# Patient Record
Sex: MALE | Race: WHITE | NOT HISPANIC OR LATINO | Employment: FULL TIME | URBAN - METROPOLITAN AREA
[De-identification: names, ages, dates, MRNs, and addresses within clinical notes are randomized per-mention and may not be internally consistent; named-entity substitution may affect disease eponyms.]

---

## 2017-08-19 ENCOUNTER — HOSPITAL ENCOUNTER (EMERGENCY)
Facility: HOSPITAL | Age: 32
Discharge: HOME | End: 2017-08-19
Attending: EMERGENCY MEDICINE | Admitting: EMERGENCY MEDICINE

## 2017-08-19 VITALS
BODY MASS INDEX: 26.41 KG/M2 | OXYGEN SATURATION: 97 % | TEMPERATURE: 99 F | WEIGHT: 195 LBS | RESPIRATION RATE: 16 BRPM | SYSTOLIC BLOOD PRESSURE: 126 MMHG | HEART RATE: 101 BPM | HEIGHT: 72 IN | DIASTOLIC BLOOD PRESSURE: 94 MMHG

## 2017-08-19 DIAGNOSIS — T40.601A OPIATE OVERDOSE, ACCIDENTAL OR UNINTENTIONAL, INITIAL ENCOUNTER: Primary | ICD-10-CM
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
## 2017-08-19 DIAGNOSIS — S01.81XA LACERATION OF FOREHEAD, INITIAL ENCOUNTER: ICD-10-CM

## 2017-08-19 PROCEDURE — 12011 RPR F/E/E/N/L/M 2.5 CM/<: CPT | Mod: ,,, | Performed by: EMERGENCY MEDICINE

## 2017-08-19 PROCEDURE — 63600175 PHARM REV CODE 636 W HCPCS: Performed by: EMERGENCY MEDICINE

## 2017-08-19 PROCEDURE — 99284 EMERGENCY DEPT VISIT MOD MDM: CPT | Mod: 25

## 2017-08-19 PROCEDURE — 99291 CRITICAL CARE FIRST HOUR: CPT | Mod: 25,,, | Performed by: EMERGENCY MEDICINE

## 2017-08-19 PROCEDURE — 12011 RPR F/E/E/N/L/M 2.5 CM/<: CPT

## 2017-08-19 PROCEDURE — 96374 THER/PROPH/DIAG INJ IV PUSH: CPT

## 2017-08-19 RX ORDER — KETOROLAC TROMETHAMINE 30 MG/ML
10 INJECTION, SOLUTION INTRAMUSCULAR; INTRAVENOUS
Status: DISCONTINUED | OUTPATIENT
Start: 2017-08-19 | End: 2017-08-19 | Stop reason: HOSPADM

## 2017-08-19 RX ORDER — NALOXONE HCL 0.4 MG/ML
0.4 VIAL (ML) INJECTION
Status: COMPLETED | OUTPATIENT
Start: 2017-08-19 | End: 2017-08-19

## 2017-08-19 RX ADMIN — NALOXONE HYDROCHLORIDE 0.4 MG: 0.4 INJECTION, SOLUTION INTRAMUSCULAR; INTRAVENOUS; SUBCUTANEOUS at 01:08

## 2017-08-19 NOTE — ED NOTES
Patient identifiers have been checked and are correct.      LOC: The patient is awake, alert, and aware of environment. The patient is oriented x 3.  APPEARANCE: No acute distress noted. Pt very fidgety.   PSYCHOSOCIAL: Patient is calm and cooperative.   SKIN: The skin is pink and warm. Multiple sores noted to face and bilateral arms. Approximately 1/2 inch lac noted above left eye brow, no active bleeding noted.  RESPIRATORY: Airway is open and patent. Bilateral chest rise and fall. Respirations are spontaneous, even and unlabored. Normal effort and rate noted. No accessory muscle use noted.   CARDIAC: Patient has a normal rate.  MUSCULOSKELETAL: No obvious deformities noted.

## 2017-08-19 NOTE — ED NOTES
Pt found to be unresponsive with oxygen sats in low 80's. NRB mask placed on pt, IV started and IV Narcan given.

## 2017-08-19 NOTE — ED PROVIDER NOTES
"Encounter Date: 8/19/2017    SCRIBE #1 NOTE: I, Bishnu Pichardo, am scribing for, and in the presence of,  Dr. Hyde. I have scribed the entire note.       History     Chief Complaint   Patient presents with    Back Pain     Pt states that he has neck and back pain. Pt states he was lifting something heavy. Pt denies taking medications. Pt is dozing off in triages and speaking to himself.     Time patient was seen by the provider: 1:52 AM      The patient is a 32 y.o. male with no significant PMHx that presents to the ED with a complaint of back pain, which began after recent heavy lifting. Pt states he has taken pain medication for back pain. Pt endorses an associated neck pain. He was noted to be somnolent in triage. Pt also reports a laceration on his forehead. States this occurred after hitting forehead on the roof of his car without loss of consciousness.       The history is provided by the patient.     Review of patient's allergies indicates:  No Known Allergies  No past medical history on file.  No past surgical history on file.  No family history on file.  Social History   Substance Use Topics    Smoking status: Current Every Day Smoker     Packs/day: 2.00     Types: Cigarettes    Smokeless tobacco: Not on file    Alcohol use Yes      Comment: "occasionally"     Review of Systems   Constitutional: Negative for fever.   HENT: Negative for sore throat.         Forehead laceration    Respiratory: Negative for shortness of breath.    Cardiovascular: Negative for chest pain.   Gastrointestinal: Negative for nausea.   Genitourinary: Negative for dysuria.   Musculoskeletal: Positive for back pain and neck pain.   Skin: Negative for rash.   Neurological: Negative for weakness.   Hematological: Does not bruise/bleed easily.       Physical Exam     Initial Vitals [08/19/17 0102]   BP Pulse Resp Temp SpO2   138/83 108 18 99 °F (37.2 °C) (!) 92 %      MAP       101.33         Physical Exam    Nursing note and vitals " reviewed.  Constitutional: He appears well-developed and well-nourished.   HENT:   Forehead abrasion    Cardiovascular: Normal rate and regular rhythm.   No murmur heard.  Pulmonary/Chest: Breath sounds normal. No respiratory distress.   Abdominal: Soft. There is no tenderness.   Musculoskeletal: Normal range of motion.   Neurological: He is alert and oriented to person, place, and time.   Skin: Skin is warm and dry.         ED Course   Lac Repair  Date/Time: 8/19/2017 2:55 AM  Performed by: JOSEFINA HANKINS.  Authorized by: JOSEFINA HANKINS   Body area: head/neck  Location details: forehead  Laceration length: 1 cm  Irrigation solution: saline  Irrigation method: syringe  Amount of cleaning: standard  Debridement: none  Comments: 3 layers of tissue adhesive placed with good apposition.         Labs Reviewed - No data to display          Medical Decision Making:   History:   Old Medical Records: I decided to obtain old medical records.  Initial Assessment:   Pt who presented with a complaint of back pain. States he took a lot of pills for the pain. In the room he had been awake, but was then found to be asleep. Pulse ox was in the low 80s and responded to narcan. He now admits to taking several pills. Denies any other drugs. Will monitor and give Toradol for his pain. He has a tiny laceration on his forehead that would not benefit from repair. Pt says he hit his head on the roof of his car earlier today with no LOC. I do not think we need to CT at this point.     Imaging Results          CT Head Without Contrast (Final result)  Result time 08/19/17 03:08:20    Final result by Nikko Roberto MD (08/19/17 03:08:20)                 Impression:        No acute intracranial abnormalities.     .      Electronically signed by: NIKKO ROBERTO MD  Date:     08/19/17  Time:    03:08              Narrative:    History: head trauma    Comparison: None    Technique:    Axial images of the brain were obtained at 5-mm intervals  from the skull base to the vertex without the administration of contrast.    Findings:    The brain parenchyma appears normal for age with good corticomedullary differentiation.  There is no evidence of acute infarct, hemorrhage, or mass.  The ventricular system is normal in size.  No mass-effect or midline shift.  There are no abnormal extra-axial fluid collections.      The paranasal sinuses and mastoid air cells are clear.      The calvarium appears intact.  .                                      Scribe Attestation:   Scribe #1: I performed the above scribed service and the documentation accurately describes the services I performed. I attest to the accuracy of the note.    Attending Attestation:         Attending Critical Care:   Critical Care Times:   ==============================================================  · Total Critical Care Time - exclusive of procedural time: 30 minutes.  ==============================================================  Critical care was necessary to treat or prevent imminent or life-threatening deterioration of the following conditions: overdose.     Physician Attestation for Scribe:  Physician Attestation Statement for Scribe #1: I, Dr. Hyde, reviewed documentation, as scribed by Bishnu Pichardo in my presence, and it is both accurate and complete.         Observed for an appropriate period with no return of opiate toxicity.  OK to d/c.        ED Course     Clinical Impression:   The primary encounter diagnosis was Opiate overdose, accidental or unintentional, initial encounter. A diagnosis of Laceration of forehead, initial encounter was also pertinent to this visit.                           Vinay Hyde MD  08/20/17 2105       Vinay Hyde MD  08/20/17 2106

## 2017-08-19 NOTE — ED NOTES
Pt now awake and alert, resting in bed. Remains on continuous pulse ox. Will continue to monitor.

## 2017-08-19 NOTE — ED TRIAGE NOTES
Reports hit head on ramesh of car today, no loc, but wants to get checked. Has approximately 1/2 inch lac above left eyebrow.

## 2022-05-28 ENCOUNTER — OFFICE VISIT (OUTPATIENT)
Dept: URGENT CARE | Facility: CLINIC | Age: 37
End: 2022-05-28
Payer: MEDICAID

## 2022-05-28 VITALS
TEMPERATURE: 99 F | WEIGHT: 195 LBS | DIASTOLIC BLOOD PRESSURE: 84 MMHG | BODY MASS INDEX: 26.41 KG/M2 | HEIGHT: 72 IN | OXYGEN SATURATION: 97 % | HEART RATE: 109 BPM | SYSTOLIC BLOOD PRESSURE: 122 MMHG | RESPIRATION RATE: 20 BRPM

## 2022-05-28 DIAGNOSIS — R36.9 PENILE DISCHARGE: ICD-10-CM

## 2022-05-28 DIAGNOSIS — S62.396A CLOSED NONDISPLACED FRACTURE OF OTHER PART OF FIFTH METACARPAL BONE OF RIGHT HAND, INITIAL ENCOUNTER: Primary | ICD-10-CM

## 2022-05-28 DIAGNOSIS — Z20.6 HIV EXPOSURE: ICD-10-CM

## 2022-05-28 DIAGNOSIS — M79.641 RIGHT HAND PAIN: ICD-10-CM

## 2022-05-28 DIAGNOSIS — R30.0 DYSURIA: ICD-10-CM

## 2022-05-28 PROCEDURE — 99203 PR OFFICE/OUTPT VISIT, NEW, LEVL III, 30-44 MIN: ICD-10-PCS | Mod: TIER,S$GLB,, | Performed by: NURSE PRACTITIONER

## 2022-05-28 PROCEDURE — 73130 XR HAND COMPLETE 3 VIEW RIGHT: ICD-10-PCS | Mod: FY,TIER,RT,S$GLB | Performed by: RADIOLOGY

## 2022-05-28 PROCEDURE — 73130 X-RAY EXAM OF HAND: CPT | Mod: FY,TIER,RT,S$GLB | Performed by: RADIOLOGY

## 2022-05-28 PROCEDURE — 87389 HIV-1 AG W/HIV-1&-2 AB AG IA: CPT | Performed by: NURSE PRACTITIONER

## 2022-05-28 PROCEDURE — 99203 OFFICE O/P NEW LOW 30 MIN: CPT | Mod: TIER,S$GLB,, | Performed by: NURSE PRACTITIONER

## 2022-05-28 PROCEDURE — 87661 TRICHOMONAS VAGINALIS AMPLIF: CPT | Performed by: NURSE PRACTITIONER

## 2022-05-28 PROCEDURE — 87591 N.GONORRHOEAE DNA AMP PROB: CPT | Performed by: NURSE PRACTITIONER

## 2022-05-28 PROCEDURE — 87491 CHLMYD TRACH DNA AMP PROBE: CPT | Performed by: NURSE PRACTITIONER

## 2022-05-28 RX ORDER — DOXYCYCLINE HYCLATE 100 MG
100 TABLET ORAL 2 TIMES DAILY
Qty: 14 TABLET | Refills: 0 | Status: SHIPPED | OUTPATIENT
Start: 2022-05-28 | End: 2022-06-04

## 2022-05-28 RX ORDER — CEFTRIAXONE 500 MG/1
500 INJECTION, POWDER, FOR SOLUTION INTRAMUSCULAR; INTRAVENOUS
Status: COMPLETED | OUTPATIENT
Start: 2022-05-28 | End: 2022-05-28

## 2022-05-28 RX ORDER — NAPROXEN 500 MG/1
500 TABLET ORAL 2 TIMES DAILY WITH MEALS
Qty: 30 TABLET | Refills: 0 | Status: SHIPPED | OUTPATIENT
Start: 2022-05-28

## 2022-05-28 RX ADMIN — CEFTRIAXONE 500 MG: 500 INJECTION, POWDER, FOR SOLUTION INTRAMUSCULAR; INTRAVENOUS at 04:05

## 2022-05-28 NOTE — PROGRESS NOTES
"Subjective:       Patient ID: Marcelo Barlow is a 36 y.o. male.    Vitals:  height is 6' (1.829 m) and weight is 88.5 kg (195 lb). His temperature is 98.6 °F (37 °C). His blood pressure is 122/84 and his pulse is 109. His respiration is 20 and oxygen saturation is 97%.     Chief Complaint: Hand Pain and Exposure to STD    This is a 36 y.o. male   who presents today with a chief complaint of right hand injury that happened a day ago. He states that he punched a "wall". He's also complaining of penile discharge that began two days ago. He states that he may have been exposed to someone who tested positive for HIV. He hasn't taken any medication to help relieve his symptoms. Pt also reports penile discharge and dysuria.     Hand Pain   The incident occurred 12 to 24 hours ago. The injury mechanism was a direct blow. The pain is present in the right hand. The quality of the pain is described as aching. The pain radiates to the right hand. The pain is at a severity of 8/10. The pain is severe. The pain has been constant since the incident. Associated symptoms include numbness and tingling. Pertinent negatives include no chest pain. The symptoms are aggravated by movement and palpation. Treatments tried: ibuprofen and tylenol. The treatment provided mild relief.   Exposure to STD  The patient's primary symptoms include penile discharge. This is a new problem. The current episode started in the past 7 days. The problem occurs constantly. The problem has been gradually worsening. The patient is experiencing no pain. Pertinent negatives include no abdominal pain, anorexia, chest pain, chills, constipation, coughing, diarrhea, discolored urine, dysuria, fever, flank pain, frequency, headaches, hematuria, hesitancy, joint pain, joint swelling, nausea, painful intercourse, rash, shortness of breath, sore throat, urgency, urinary retention or vomiting. The penile discharge was white. Nothing aggravates the symptoms. He has " tried nothing for the symptoms. He is sexually active. He inconsistently uses condoms. Yes, his partner has an STD.       Constitution: Negative for chills and fever.   HENT: Negative for sore throat.    Cardiovascular: Negative for chest pain.   Respiratory: Negative for cough and shortness of breath.    Gastrointestinal: Negative for abdominal pain, nausea, vomiting, constipation and diarrhea.   Genitourinary: Positive for penile discharge. Negative for dysuria, frequency, urgency and flank pain.   Skin: Negative for rash.   Neurological: Positive for numbness. Negative for headaches.       Objective:      Physical Exam   HENT:   Head: Normocephalic and atraumatic.   Ears:   Right Ear: External ear normal.   Left Ear: External ear normal.   Pulmonary/Chest: Effort normal.   Abdominal: Normal appearance.   Musculoskeletal: Normal range of motion.         General: Swelling, tenderness and signs of injury present. Normal range of motion.      Right hand: He exhibits normal capillary refill. Right little finger: Exhibits swelling and tenderness.      Left hand: He exhibits normal capillary refill.        Hands:    Neurological: no focal deficit. He is alert.   Nursing note and vitals reviewed.      XR HAND COMPLETE 3 VIEW RIGHT    Result Date: 5/28/2022  EXAMINATION: XR HAND COMPLETE 3 VIEW RIGHT CLINICAL HISTORY: Pain, unspecified TECHNIQUE: PA, lateral, and oblique views of the right hand were performed. COMPARISON: 04/03/2012 FINDINGS: Three views right hand. There is a stable radiopaque foreign body within the soft tissues of the distal 3rd digit medially.  There is acute appearing fracture involving the distal aspect of the 5th metacarpal.  Remote fractures noted of 3rd and 4th metacarpals.  Edema overlies the fracture site.  No dislocation.     1. Fifth metacarpal fracture as above. Electronically signed by: Ravinder tSeiner MD Date:    05/28/2022 Time:    16:38  Assessment:       1. Closed nondisplaced fracture  of other part of fifth metacarpal bone of right hand, initial encounter    2. Right hand pain    3. HIV exposure    4. Penile discharge    5. Dysuria          Plan:     FINDINGS: Three views right hand. There is a stable radiopaque foreign body within the soft tissues of the distal 3rd digit medially.  There is acute appearing fracture involving the distal aspect of the 5th metacarpal.  Remote fractures noted of 3rd and 4th metacarpals.  Edema overlies the fracture site.  No dislocation.    Pt placed in removable splint. External referral placed for orthopedics    Closed nondisplaced fracture of other part of fifth metacarpal bone of right hand, initial encounter  -     THUMB ORTHOSIS SPLINT UNIVERSAL FOR HOME USE  -     Ambulatory referral/consult to Hand Surgery  -     naproxen (NAPROSYN) 500 MG tablet; Take 1 tablet (500 mg total) by mouth 2 (two) times daily with meals.  Dispense: 30 tablet; Refill: 0    Right hand pain  -     XR HAND COMPLETE 3 VIEW RIGHT; Future; Expected date: 05/28/2022    HIV exposure  -     HIV 1/2 Ag/Ab (4th Gen)    Penile discharge  -     Trichomonas vaginalis, RNA, Qual, Urine  -     C. trachomatis/N. gonorrhoeae by AMP DNA [facility]; Urine  -     doxycycline (VIBRA-TABS) 100 MG tablet; Take 1 tablet (100 mg total) by mouth 2 (two) times daily. for 7 days  Dispense: 14 tablet; Refill: 0  -     cefTRIAXone injection 500 mg    Dysuria  -     Trichomonas vaginalis, RNA, Qual, Urine  -     C. trachomatis/N. gonorrhoeae by AMP DNA [facility]; Urine  -     doxycycline (VIBRA-TABS) 100 MG tablet; Take 1 tablet (100 mg total) by mouth 2 (two) times daily. for 7 days  Dispense: 14 tablet; Refill: 0  -     cefTRIAXone injection 500 mg                 Patient Instructions                                Orthopedic Follow up Discharge Instructions    If your condition worsens or fails to improve we recommend that you receive another evaluation at the ER immediately or contact your PCP to discuss your  concerns or return here. You must understand that you've received an urgent care treatment only and that you may be released before all your medical problems are known or treated. [facility].   If you were prescribed a narcotic or muscle relaxant do not drive or operate heavy machinery while taking these medications   Tylenol or ibuprofen can also be used as directed for pain unless you have an allergy to them or medical condition such as stomach ulcers, kidney or liver disease or blood thinners etc for which you should not be taking these type of medications.   If you were given a prescription NSAID here do not also take any over the counter NSAID like ibuprofen, aleve, advil, motrin etc   RICE which means rest, ice compression and elevation are helpful.   If you have Low Back Pain and develop bowel or bladder symptoms or increase pain going down your legs go to the ER immediately.   If you were given a splint wear it at all times.   If you were given crutches use them as we instructed. Do not rest your armpits on the foam pad or you risk compressing the nerves and the vessels there   Follow up with the orthopedist in 1 week if you continue with pain.   Sometimes it can take up to 1 week for stress fractures to show up on an X-ray, and may need reimaging or a CT or MRI of the affected area.       [facility]   [phone]

## 2022-05-28 NOTE — PATIENT INSTRUCTIONS
Orthopedic Follow up Discharge Instructions    If your condition worsens or fails to improve we recommend that you receive another evaluation at the ER immediately or contact your PCP to discuss your concerns or return here. You must understand that you've received an urgent care treatment only and that you may be released before all your medical problems are known or treated. You the patient will arrange for [facility] care as instructed.   If you were prescribed a narcotic or muscle relaxant do not drive or operate heavy machinery while taking these medications   Tylenol or ibuprofen can also be used as directed for pain unless you have an allergy to them or medical condition such as stomach ulcers, kidney or liver disease or blood thinners etc for which you should not be taking these type of medications.   If you were given a prescription NSAID here do not also take any over the counter NSAID like ibuprofen, aleve, advil, motrin etc   RICE which means rest, ice compression and elevation are helpful.   If you have Low Back Pain and develop bowel or bladder symptoms or increase pain going down your legs go to the ER immediately.   If you were given a splint wear it at all times.   If you were given crutches use them as we instructed. Do not rest your armpits on the foam pad or you risk compressing the nerves and the vessels there   Follow up with the orthopedist in 1 week if you continue with pain.   Sometimes it can take up to 1 week for stress fractures to show up on an X-ray, and may need reimaging or a CT or MRI of the affected area.       [facility]   [phone]

## 2022-05-30 LAB — HIV 1+2 AB+HIV1 P24 AG SERPL QL IA: NEGATIVE

## 2022-05-31 LAB
C TRACH DNA SPEC QL NAA+PROBE: DETECTED
N GONORRHOEA DNA SPEC QL NAA+PROBE: DETECTED

## 2022-06-02 LAB
T VAGINALIS RRNA SPEC QL NAA+PROBE: NOT DETECTED
TRICHOMONAS VAGINALIS RNA, QUAL, SOURCE: NORMAL